# Patient Record
Sex: MALE | NOT HISPANIC OR LATINO | URBAN - METROPOLITAN AREA
[De-identification: names, ages, dates, MRNs, and addresses within clinical notes are randomized per-mention and may not be internally consistent; named-entity substitution may affect disease eponyms.]

---

## 2023-11-16 ENCOUNTER — MEDICAL CORRESPONDENCE (OUTPATIENT)
Dept: HEALTH INFORMATION MANAGEMENT | Facility: CLINIC | Age: 73
End: 2023-11-16

## 2023-11-20 ENCOUNTER — MEDICAL CORRESPONDENCE (OUTPATIENT)
Dept: HEALTH INFORMATION MANAGEMENT | Facility: CLINIC | Age: 73
End: 2023-11-20
Payer: COMMERCIAL

## 2023-11-28 ENCOUNTER — TRANSCRIBE ORDERS (OUTPATIENT)
Dept: OTHER | Age: 73
End: 2023-11-28

## 2023-11-28 DIAGNOSIS — H02.132 SENILE ECTROPION OF RIGHT LOWER EYELID: Primary | ICD-10-CM

## 2024-01-17 ENCOUNTER — OFFICE VISIT (OUTPATIENT)
Dept: OPHTHALMOLOGY | Facility: CLINIC | Age: 74
End: 2024-01-17
Attending: OPTOMETRIST
Payer: COMMERCIAL

## 2024-01-17 DIAGNOSIS — H02.834 DERMATOCHALASIS OF BOTH UPPER EYELIDS: ICD-10-CM

## 2024-01-17 DIAGNOSIS — H02.831 DERMATOCHALASIS OF BOTH UPPER EYELIDS: ICD-10-CM

## 2024-01-17 DIAGNOSIS — H02.135 SENILE ECTROPION OF BOTH LOWER EYELIDS: Primary | ICD-10-CM

## 2024-01-17 DIAGNOSIS — H02.59 FLOPPY EYELID SYNDROME OF BOTH EYES: ICD-10-CM

## 2024-01-17 DIAGNOSIS — H02.132 SENILE ECTROPION OF BOTH LOWER EYELIDS: Primary | ICD-10-CM

## 2024-01-17 PROCEDURE — 92285 EXTERNAL OCULAR PHOTOGRAPHY: CPT | Mod: GC | Performed by: OPHTHALMOLOGY

## 2024-01-17 PROCEDURE — 99204 OFFICE O/P NEW MOD 45 MIN: CPT | Mod: GC | Performed by: OPHTHALMOLOGY

## 2024-01-17 RX ORDER — METFORMIN HCL 500 MG
2000 TABLET, EXTENDED RELEASE 24 HR ORAL
COMMUNITY
Start: 2023-02-27

## 2024-01-17 RX ORDER — FUROSEMIDE 20 MG
20 TABLET ORAL
COMMUNITY
Start: 2022-04-11

## 2024-01-17 RX ORDER — LEVOTHYROXINE SODIUM 88 UG/1
0.09 TABLET ORAL
COMMUNITY
Start: 2023-02-27

## 2024-01-17 RX ORDER — TAMSULOSIN HYDROCHLORIDE 0.4 MG/1
0.8 CAPSULE ORAL
COMMUNITY
Start: 2023-09-26

## 2024-01-17 RX ORDER — METOPROLOL SUCCINATE 25 MG/1
12.5 TABLET, EXTENDED RELEASE ORAL
COMMUNITY
Start: 2023-02-27

## 2024-01-17 RX ORDER — CHOLECALCIFEROL (VITAMIN D3) 1250 MCG
50000 CAPSULE ORAL
COMMUNITY

## 2024-01-17 RX ORDER — SPIRONOLACTONE 25 MG/1
37.5 TABLET ORAL
COMMUNITY
Start: 2023-02-27

## 2024-01-17 RX ORDER — ROSUVASTATIN CALCIUM 20 MG/1
20 TABLET, COATED ORAL
COMMUNITY
Start: 2023-11-30

## 2024-01-17 RX ORDER — AMIODARONE HYDROCHLORIDE 200 MG/1
TABLET ORAL
COMMUNITY
Start: 2023-07-10

## 2024-01-17 RX ORDER — ASPIRIN 81 MG/1
81 TABLET ORAL
COMMUNITY

## 2024-01-17 ASSESSMENT — EXTERNAL EXAM - LEFT EYE: OS_EXAM: NORMAL

## 2024-01-17 ASSESSMENT — CONF VISUAL FIELD
OD_SUPERIOR_NASAL_RESTRICTION: 0
OS_SUPERIOR_NASAL_RESTRICTION: 0
OD_INFERIOR_TEMPORAL_RESTRICTION: 0
OD_SUPERIOR_TEMPORAL_RESTRICTION: 0
OS_NORMAL: 1
OS_SUPERIOR_TEMPORAL_RESTRICTION: 0
OD_NORMAL: 1
OD_INFERIOR_NASAL_RESTRICTION: 0
OS_INFERIOR_TEMPORAL_RESTRICTION: 0
OS_INFERIOR_NASAL_RESTRICTION: 0
METHOD: COUNTING FINGERS

## 2024-01-17 ASSESSMENT — EXTERNAL EXAM - RIGHT EYE: OD_EXAM: NORMAL

## 2024-01-17 ASSESSMENT — TONOMETRY
IOP_METHOD: ICARE
OD_IOP_MMHG: 14
OS_IOP_MMHG: 18

## 2024-01-17 ASSESSMENT — VISUAL ACUITY
OS_SC: 20/30
METHOD: SNELLEN - LINEAR
OD_SC: 20/25

## 2024-01-17 NOTE — NURSING NOTE
Chief Complaints and History of Present Illnesses   Patient presents with    Ectropion Evaluation       Chief Complaint(s) and History of Present Illness(es)       Ectropion Evaluation              Laterality: right lower lid              Comments    Patient referred by Dr Swenson for ectropion evaluation, RLL.   Patient states that about a year ago  his right eye started to tear up more and the lower lid started to pull away from the eye. In the past year it has continued to get worse.   Gets matter built up in the eye over night and it becomes stuck shut in the morning. No drops used.   No pain.     DM2 -   A1C was 6.0 last week according to patient                   Juancarlos Camargo, Ophthalmic Assistant

## 2024-01-17 NOTE — LETTER
2024         RE:  :  MRN: Papito White  1950  [id number]     Dear Dr. Swenson,    Thank you for asking me to see your patient, Papito White, for an oculoplastic   consultation.  My assessment and plan are below.  For further details, please see my attached clinic note.      Chief Complaint(s) and History of Present Illness(es)     Ectropion Evaluation            Laterality: right lower lid      Comments    Patient referred by Dr Swenson for ectropion evaluation, RLL.   Patient states that about a year ago  his right eye started to tear up   more and the lower lid started to pull away from the eye. In the past year   it has continued to get worse.   Gets matter built up in the eye over night and it becomes stuck shut in   the morning. No drops used.   No pain.     DM2 -   A1C was 6.0 last week according to patient    Not bothered by dermatochalasis.     Assessment & Plan     Papito White is a 74 year old male with the following diagnoses:     ICD-10-CM    1. Senile ectropion of both lower eyelids  H02.132     H02.135       2. 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 Left lower lid also has mild punctal ectropion but not symptomatic. Patient not bothered by dermatochalasis. Wears CPAP at night.  Of note, patient has history of multiple cardiac bypass procedures and has T2DM.     PLAN:    Left lower eyelid cicatricial ectropion repair with FTSG from right upper eyelid. Temporary tarsorrhaphy. See if can identify lower punctum on the left, if can identify it, can place mini Monoka stent.     - Frequent artificial tears.   - Recommend discuss cataract symptoms with home eye doctor.     Anticoagulation: Currently takes baby ASA.         Again, thank you for allowing me to participate in the care of your  patient.      Sincerely,    Michelle Johns MD  Department of Ophthalmology and Visual Neurosciences  [facility]    CC: Christel Swenson OD  [place] [id number] [facility] [address]  Via Fax: [phone]      -----------------------------------------------------------------------------------------    [facility] 2024 NEURO-PLASTICS REVIEW COURSE     Who:  All Optometrists    What: Neuro-ophthalmology and Oculoplastics Review for Optometrists    When: Saturday, February 24, 2024    COPE credits will be available for all lectures and case discussion sessions  Where: [facility], Room 3-110                  [place] [address]    Why: To improve the care of challenging patients.  To earn COPE credits.  How: Online registration can be completed at:                            [email or web address]    Cost = $100 early bird registration (before February 5, 2024)

## 2024-01-17 NOTE — PROGRESS NOTES
Chief Complaint(s) and History of Present Illness(es)     Ectropion Evaluation            Laterality: right lower lid      Comments    Patient referred by Dr Swenson for ectropion evaluation, RLL.   Patient states that about a year ago  his right eye started to tear up   more and the lower lid started to pull away from the eye. In the past year   it has continued to get worse.   Gets matter built up in the eye over night and it becomes stuck shut in   the morning. No drops used.   No pain.     DM2 -   A1C was 6.0 last week according to patient    Not bothered by dermatochalasis.     Assessment & Plan     Papito White is a 74 year old male with the following diagnoses:     ICD-10-CM    1. Senile ectropion of both lower eyelids  H02.132     H02.135       2. 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 Left lower lid also has mild punctal ectropion but not symptomatic. Patient not bothered by dermatochalasis. Wears CPAP at night.  Of note, patient has history of multiple cardiac bypass procedures and has T2DM.     PLAN:    Right lower eyelid cicatricial ectropion repair with FTSG from right upper eyelid. Temporary tarsorrhaphy. See if can identify lower punctum on the left, if can identify it, can place mini Monoka stent.     - Frequent artificial tears.   - Recommend discuss cataract symptoms with home eye doctor.     Anticoagulation: Currently takes baby ASA.       Patient disposition:   No follow-ups on file.     Vishnu Underwood MD  Ophthalmology, PGY-4         Attending Physician Attestation: Complete documentation of historical and exam elements from today's encounter can be found in the full encounter summary report (not reduplicated in this progress note). I personally obtained the chief complaint(s) and history of present illness.  I confirmed and edited as necessary the review of systems, past medical/surgical history, family history, social history, and examination findings as documented by others; and I examined the patient myself. I personally reviewed the relevant tests, images, and reports as documented above. I formulated and edited as necessary the assessment and plan and discussed the findings and management plan with the patient.  -Michelle Johns MD    Today with Papito White  and his daughter, I reviewed the indications, risks, benefits, and alternatives of the proposed surgical procedure including, but not limited to, failure obtain the desired result  and need for additional surgery, bleeding, infection, loss of vision, loss of the eye, and the remote possibility of permanent damage to any organ system or death with the use of anesthesia.  I provided multiple opportunities for the questions, answered all questions to the best of my ability, and confirmed that my answers and my discussion were understood.   Michelle Johns MD

## 2024-02-15 ENCOUNTER — ANESTHESIA EVENT (OUTPATIENT)
Dept: SURGERY | Facility: AMBULATORY SURGERY CENTER | Age: 74
End: 2024-02-15
Payer: COMMERCIAL

## 2024-02-18 NOTE — ANESTHESIA PREPROCEDURE EVALUATION
"Anesthesia Pre-Procedure Evaluation    Patient: Papito White   MRN: [id number] : 1950        Procedure : Procedure(s):  Right lower eyelid ectropion repair with full thickness skin graft and possible stent of lacrimal system.  Full thickness skin graft from right upper eyelid          No past medical history on file.   No past surgical history on file.   Allergies   Allergen Reactions    Lisinopril Anaphylaxis, Other (See Comments) and Swelling     Other Reaction(s): Lip swelling    Lip swelling   Other reaction(s): Lip swelling    No Clinical Screening - See Comments Other (See Comments)     Difficult breathing    Amlodipine Swelling     Mild ankle swelling.    Empagliflozin      Other Reaction(s): Burning sensation    Vardenafil Other (See Comments)     Watering eye.   Other reaction(s): Watery eye      Social History     Tobacco Use    Smoking status: Never    Smokeless tobacco: Never   Substance Use Topics    Alcohol use: Not on file      Wt Readings from Last 1 Encounters:   No data found for Wt           Physical Exam    Airway        Mallampati: II   TM distance: > 3 FB   Neck ROM: full   Mouth opening: > 3 cm    Respiratory Devices and Support         Dental       (+) Modest Abnormalities - crowns, retainers, 1 or 2 missing teeth and Removable bridges or other hardware      Cardiovascular   cardiovascular exam normal          Pulmonary   pulmonary exam normal                OUTSIDE LABS:  CBC: No results found for: \"WBC\", \"HGB\", \"HCT\", \"PLT\"  BMP: No results found for: \"NA\", \"POTASSIUM\", \"CHLORIDE\", \"CO2\", \"BUN\", \"CR\", \"GLC\"  COAGS: No results found for: \"PTT\", \"INR\", \"FIBR\"  POC: No results found for: \"BGM\", \"HCG\", \"HCGS\"  HEPATIC: No results found for: \"ALBUMIN\", \"PROTTOTAL\", \"ALT\", \"AST\", \"GGT\", \"ALKPHOS\", \"BILITOTAL\", \"BILIDIRECT\", \"LOW\"  OTHER: No results found for: \"PH\", \"LACT\", \"A1C\", \"CHINYERE\", \"PHOS\", \"MAG\", \"LIPASE\", \"AMYLASE\", \"TSH\", \"T4\", \"T3\", \"CRP\", \"SED\"    Anesthesia Plan    ASA " Status:  3    NPO Status:  NPO Appropriate    Anesthesia Type: MAC.     - Reason for MAC: straight local not clinically adequate   Induction: Intravenous, Propofol.   Maintenance: TIVA.        Consents    Anesthesia Plan(s) and associated risks, benefits, and realistic alternatives discussed. Questions answered and patient/representative(s) expressed understanding.     - Discussed:     - Discussed with:  Patient, Other (See Comment)      - Extended Intubation/Ventilatory Support Discussed: No.      - Patient is DNR/DNI Status: No     Use of blood products discussed: No .     Postoperative Care    Pain management: IV analgesics, Oral pain medications, Multi-modal analgesia.   PONV prophylaxis: Dexamethasone or Solumedrol, Ondansetron (or other 5HT-3), Background Propofol Infusion     Comments:               Mike Brito MD    I have reviewed the pertinent notes and labs in the chart from the past 30 days and (re)examined the patient.  Any updates or changes from those notes are reflected in this note.

## 2024-02-19 ENCOUNTER — ANESTHESIA (OUTPATIENT)
Dept: SURGERY | Facility: AMBULATORY SURGERY CENTER | Age: 74
End: 2024-02-19
Payer: COMMERCIAL

## 2024-02-19 ENCOUNTER — HOSPITAL ENCOUNTER (OUTPATIENT)
Facility: AMBULATORY SURGERY CENTER | Age: 74
Discharge: HOME | End: 2024-02-19
Attending: OPHTHALMOLOGY | Admitting: OPHTHALMOLOGY
Payer: COMMERCIAL

## 2024-02-19 VITALS
SYSTOLIC BLOOD PRESSURE: 138 MMHG | OXYGEN SATURATION: 97 % | HEART RATE: 71 BPM | DIASTOLIC BLOOD PRESSURE: 68 MMHG | RESPIRATION RATE: 16 BRPM | TEMPERATURE: 97.7 F | WEIGHT: 230 LBS

## 2024-02-19 DIAGNOSIS — H02.135 SENILE ECTROPION OF BOTH LOWER EYELIDS: Primary | ICD-10-CM

## 2024-02-19 DIAGNOSIS — H02.132 SENILE ECTROPION OF BOTH LOWER EYELIDS: Primary | ICD-10-CM

## 2024-02-19 LAB
GLUCOSE BLDC GLUCOMTR-MCNC: 137 MG/DL (ref 70–99)
GLUCOSE BLDC GLUCOMTR-MCNC: 148 MG/DL (ref 70–99)

## 2024-02-19 PROCEDURE — 68440 SNIP INC LACRIMAL PUNCTUM: CPT | Mod: RT

## 2024-02-19 PROCEDURE — 67917 REPAIR EYELID DEFECT: CPT | Performed by: NURSE ANESTHETIST, CERTIFIED REGISTERED

## 2024-02-19 PROCEDURE — G8907 PT DOC NO EVENTS ON DISCHARG: HCPCS

## 2024-02-19 PROCEDURE — 67917 REPAIR EYELID DEFECT: CPT | Mod: E4 | Performed by: OPHTHALMOLOGY

## 2024-02-19 PROCEDURE — 15260 FTH/GFT FR N/E/E/L 20 SQCM/<: CPT

## 2024-02-19 PROCEDURE — 99100 ANES PT EXTEME AGE<1 YR&>70: CPT | Performed by: STUDENT IN AN ORGANIZED HEALTH CARE EDUCATION/TRAINING PROGRAM

## 2024-02-19 PROCEDURE — 99100 ANES PT EXTEME AGE<1 YR&>70: CPT | Performed by: NURSE ANESTHETIST, CERTIFIED REGISTERED

## 2024-02-19 PROCEDURE — 68815 PROBE NASOLACRIMAL DUCT: CPT | Mod: RT

## 2024-02-19 PROCEDURE — G8916 PT W IV AB GIVEN ON TIME: HCPCS

## 2024-02-19 PROCEDURE — 67917 REPAIR EYELID DEFECT: CPT | Performed by: STUDENT IN AN ORGANIZED HEALTH CARE EDUCATION/TRAINING PROGRAM

## 2024-02-19 PROCEDURE — 68815 PROBE NASOLACRIMAL DUCT: CPT | Mod: RT | Performed by: OPHTHALMOLOGY

## 2024-02-19 PROCEDURE — 67875 CLOSURE OF EYELID BY SUTURE: CPT | Mod: RT

## 2024-02-19 PROCEDURE — 15004 WOUND PREP F/N/HF/G: CPT | Mod: GC | Performed by: OPHTHALMOLOGY

## 2024-02-19 PROCEDURE — 67961 REVISION OF EYELID: CPT | Mod: E4

## 2024-02-19 PROCEDURE — 15260 FTH/GFT FR N/E/E/L 20 SQCM/<: CPT | Mod: GC | Performed by: OPHTHALMOLOGY

## 2024-02-19 PROCEDURE — 68840 EXPLORE/IRRIGATE TEAR DUCTS: CPT | Mod: RT | Performed by: OPHTHALMOLOGY

## 2024-02-19 DEVICE — A STERILE, IMPLANTABLE, SINGLE-LUMEN TUBE INTENDED TO PROVIDE TEAR DRAINAGE FROM THE FRONT SURFACE OF THE EYE, TYPICALLY INTO THE NASAL CAVITY OR A PARANASAL SINUS, AS A DRAINAGE TREATMENT FOR LACRIMAL CANALICULAR PATHOLOGIES IN FUNCTIONAL OR OBSTRUCTIVE EPIPHORA; IT MAY ALSO BE INTENDED TO FACILITATE SALINE SOLUTION IRRIGATION TO A PARANASAL SINUS (E.G., ETHMOID SINUS) TO MANAGE CHRONIC RHINOSINUSITIS. ALSO REFERRED TO AS A LACRIMAL STENT, THE DEVICE MAY BE IMPLANTED AFTER SURGERY TO DILATE OR CREATE A SURGICAL PASSAGE [E.G., DACRYOCYSTOSTOMY/DACRYOCYSTORHINOSTOMY (DCR)], AND IS MADE OF GLASS OR SYNTHETIC POLYMER MATERIAL(S) [E.G., SILICONE].
Type: IMPLANTABLE DEVICE | Site: EYE | Status: FUNCTIONAL
Brand: LACRIMAL TUBE

## 2024-02-19 RX ORDER — SODIUM CHLORIDE, SODIUM LACTATE, POTASSIUM CHLORIDE, CALCIUM CHLORIDE 600; 310; 30; 20 MG/100ML; MG/100ML; MG/100ML; MG/100ML
INJECTION, SOLUTION INTRAVENOUS CONTINUOUS
Status: DISCONTINUED | OUTPATIENT
Start: 2024-02-19 | End: 2024-02-20 | Stop reason: HOSPADM

## 2024-02-19 RX ORDER — FENTANYL CITRATE 50 UG/ML
25 INJECTION, SOLUTION INTRAMUSCULAR; INTRAVENOUS EVERY 5 MIN PRN
Status: DISCONTINUED | OUTPATIENT
Start: 2024-02-19 | End: 2024-02-20 | Stop reason: HOSPADM

## 2024-02-19 RX ORDER — ERYTHROMYCIN 5 MG/G
OINTMENT OPHTHALMIC PRN
Status: DISCONTINUED | OUTPATIENT
Start: 2024-02-19 | End: 2024-02-19 | Stop reason: HOSPADM

## 2024-02-19 RX ORDER — OXYCODONE HYDROCHLORIDE 5 MG/1
10 TABLET ORAL
Status: DISCONTINUED | OUTPATIENT
Start: 2024-02-19 | End: 2024-02-20 | Stop reason: HOSPADM

## 2024-02-19 RX ORDER — OXYCODONE HYDROCHLORIDE 5 MG/1
5 TABLET ORAL
Status: DISCONTINUED | OUTPATIENT
Start: 2024-02-19 | End: 2024-02-20 | Stop reason: HOSPADM

## 2024-02-19 RX ORDER — ACETAMINOPHEN 325 MG/1
975 TABLET ORAL ONCE
Status: COMPLETED | OUTPATIENT
Start: 2024-02-19 | End: 2024-02-19

## 2024-02-19 RX ORDER — ONDANSETRON 2 MG/ML
4 INJECTION INTRAMUSCULAR; INTRAVENOUS EVERY 30 MIN PRN
Status: DISCONTINUED | OUTPATIENT
Start: 2024-02-19 | End: 2024-02-20 | Stop reason: HOSPADM

## 2024-02-19 RX ORDER — CEFAZOLIN SODIUM 2 G/50ML
2 SOLUTION INTRAVENOUS
Status: COMPLETED | OUTPATIENT
Start: 2024-02-19 | End: 2024-02-19

## 2024-02-19 RX ORDER — FENTANYL CITRATE 50 UG/ML
50 INJECTION, SOLUTION INTRAMUSCULAR; INTRAVENOUS EVERY 5 MIN PRN
Status: DISCONTINUED | OUTPATIENT
Start: 2024-02-19 | End: 2024-02-20 | Stop reason: HOSPADM

## 2024-02-19 RX ORDER — ONDANSETRON 4 MG/1
4 TABLET, ORALLY DISINTEGRATING ORAL EVERY 30 MIN PRN
Status: DISCONTINUED | OUTPATIENT
Start: 2024-02-19 | End: 2024-02-20 | Stop reason: HOSPADM

## 2024-02-19 RX ORDER — PROPOFOL 10 MG/ML
INJECTION, EMULSION INTRAVENOUS PRN
Status: DISCONTINUED | OUTPATIENT
Start: 2024-02-19 | End: 2024-02-19

## 2024-02-19 RX ORDER — LIDOCAINE HYDROCHLORIDE 20 MG/ML
INJECTION, SOLUTION INFILTRATION; PERINEURAL PRN
Status: DISCONTINUED | OUTPATIENT
Start: 2024-02-19 | End: 2024-02-19

## 2024-02-19 RX ORDER — LIDOCAINE 40 MG/G
CREAM TOPICAL
Status: DISCONTINUED | OUTPATIENT
Start: 2024-02-19 | End: 2024-02-20 | Stop reason: HOSPADM

## 2024-02-19 RX ORDER — FENTANYL CITRATE 50 UG/ML
INJECTION, SOLUTION INTRAMUSCULAR; INTRAVENOUS PRN
Status: DISCONTINUED | OUTPATIENT
Start: 2024-02-19 | End: 2024-02-19

## 2024-02-19 RX ORDER — CEFAZOLIN SODIUM 2 G/50ML
2 SOLUTION INTRAVENOUS SEE ADMIN INSTRUCTIONS
Status: DISCONTINUED | OUTPATIENT
Start: 2024-02-19 | End: 2024-02-20 | Stop reason: HOSPADM

## 2024-02-19 RX ORDER — ERYTHROMYCIN 5 MG/G
OINTMENT OPHTHALMIC
Qty: 3.5 G | Refills: 0 | Status: SHIPPED | OUTPATIENT
Start: 2024-02-23

## 2024-02-19 RX ORDER — TETRACAINE HYDROCHLORIDE 5 MG/ML
SOLUTION OPHTHALMIC PRN
Status: DISCONTINUED | OUTPATIENT
Start: 2024-02-19 | End: 2024-02-19 | Stop reason: HOSPADM

## 2024-02-19 RX ADMIN — PROPOFOL 130 MG: 10 INJECTION, EMULSION INTRAVENOUS at 14:07

## 2024-02-19 RX ADMIN — CEFAZOLIN SODIUM 2 G: 2 SOLUTION INTRAVENOUS at 14:00

## 2024-02-19 RX ADMIN — PROPOFOL 40 MG: 10 INJECTION, EMULSION INTRAVENOUS at 14:52

## 2024-02-19 RX ADMIN — FENTANYL CITRATE 25 MCG: 50 INJECTION, SOLUTION INTRAMUSCULAR; INTRAVENOUS at 14:05

## 2024-02-19 RX ADMIN — LIDOCAINE HYDROCHLORIDE 60 MG: 20 INJECTION, SOLUTION INFILTRATION; PERINEURAL at 14:07

## 2024-02-19 RX ADMIN — ACETAMINOPHEN 975 MG: 325 TABLET ORAL at 13:54

## 2024-02-19 RX ADMIN — SODIUM CHLORIDE, SODIUM LACTATE, POTASSIUM CHLORIDE, CALCIUM CHLORIDE: 600; 310; 30; 20 INJECTION, SOLUTION INTRAVENOUS at 13:54

## 2024-02-19 RX ADMIN — FENTANYL CITRATE 25 MCG: 50 INJECTION, SOLUTION INTRAMUSCULAR; INTRAVENOUS at 14:19

## 2024-02-19 RX ADMIN — PROPOFOL 30 MG: 10 INJECTION, EMULSION INTRAVENOUS at 14:19

## 2024-02-19 NOTE — ANESTHESIA CARE TRANSFER NOTE
Patient: Papito White    Procedure: Procedure(s):  Right lower eyelid ectropion repair with full thickness skin graft and stent of lacrimal system.  Full thickness skin graft from right upper eyelid       Diagnosis: Senile ectropion of both lower eyelids [H02.132, H02.135]  Diagnosis Additional Information: No value filed.    Anesthesia Type:   MAC     Note:    Oropharynx: oropharynx clear of all foreign objects    Oxygen Supplementation: room air    Independent Airway: airway patency satisfactory and stable  Dentition: dentition unchanged  Vital Signs Stable: post-procedure vital signs reviewed and stable  Report to RN Given: handoff report given  Patient transferred to: Phase II  Comments: To Phase II. Report to RN.  VSS Resp status stable.  Handoff Report: Identifed the Patient, Identified the Reponsible Provider, Reviewed the pertinent medical history, Discussed the surgical course, Reviewed Intra-OP anesthesia mangement and issues during anesthesia, Set expectations for post-procedure period and Allowed opportunity for questions and acknowledgement of understanding  Vitals:  Vitals Value Taken Time   BP     Temp     Pulse     Resp     SpO2         Electronically Signed By: CHASE Tucker CRNA  February 19, 2024  3:05 PM

## 2024-02-19 NOTE — ANESTHESIA POSTPROCEDURE EVALUATION
Patient: Papito White    Procedure: Procedure(s):  Right lower eyelid ectropion repair with full thickness skin graft and stent of lacrimal system.  Full thickness skin graft from right upper eyelid       Anesthesia Type:  MAC    Note:  Disposition: Outpatient   Postop Pain Control: Uneventful            Sign Out: Well controlled pain   PONV: No   Neuro/Psych: Uneventful            Sign Out: Acceptable/Baseline neuro status   Airway/Respiratory: Uneventful            Sign Out: Acceptable/Baseline resp. status   CV/Hemodynamics: Uneventful            Sign Out: Acceptable CV status; No obvious hypovolemia; No obvious fluid overload   Other NRE:    DID A NON-ROUTINE EVENT OCCUR?            Last vitals:  Vitals Value Taken Time   /70 02/19/24 1501   Temp 97.7  F (36.5  C) 02/19/24 1501   Pulse     Resp 16 02/19/24 1501   SpO2 96 % 02/19/24 1501       Electronically Signed By: Mike Brito MD  February 19, 2024  3:25 PM

## 2024-02-19 NOTE — DISCHARGE INSTRUCTIONS
Post-operative Instructions  Ophthalmic Plastic and Reconstructive Surgery    Michelle Johns M.D.     All instructions apply to the operated eye(s) or eyelid(s).    Wound care and personal care  ? If a patch or bandage has been placed, please leave this in place until seen by your physician. Ensure that the bandage does not get wet when you take a shower.   ? Do not apply ice.  ? You may shower or wash your hair the day after surgery. Do not go swimming for at least 2 weeks to prevent contamination of your wounds.  ? You may go for walks and light activity is ok, but no heavy (over 15 pounds) lifting, bending or excessive straining for one week.   ? Do not apply make-up to the eyes or eyelids for 2 weeks after surgery.  ? Expect some swelling, bruising, black eye (even into the lower eyelids and cheeks). Also expect serum caking, crusting and discharge from the eye and/or incisions. A small amount of surface bleeding, and depending on the type of surgery, bleeding from the inside of the eyelid, is normal for the first 48 hours.  ? Avoid straining, bending at the waist, or lifting more than 15 pounds for 1 week. Sleeping with your head elevated, such as in a recliner, for the first several days can help swelling resolve more quickly.   ? Do continue to ambulate (walk) as you normally would - being sedentary after surgery can cause blood clots.   ? Your eye(s) and eyelid(s) may be painful and tender. This is normal after surgery.      Contact information and follow-up  ? Return to the Eye Clinic for a follow-up appointment with your physician as scheduled. If no appointment has been scheduled:   - [phone] for an appointment with Dr. Johns within 1 week from your date of surgery.     ? For severe pain, bleeding, or loss of vision, call the [facility] at [phone].    After hours or on weekends and holidays, call [phone] and ask to speak with the ophthalmologist on  call.    An on call person can be reached after hours for concerns. The on call doctor should not call in medication refill requests after hours or on weekends, so please plan accordingly. An effort has been made to provide adequate pain medications following every surgery, and refills will not be provided in most instances.     Medications  ? Restart all regular home medications and eye drops. If you take Plavix or Aspirin on a regular basis, wait for 72 hours after your surgery before restarting these in order to decrease the risk of bleeding complications.  ? Avoid aspirin and aspirin-like medications (Motrin, Aleve, Ibuprofen, Katerin-Dieterich etc) for 72 hours to reduce the risk of bleeding. You may take Tylenol (acetaminophen) for pain.  ? In addition to your home medications, take the following post-operative medications as prescribed by your physician.    ? Apply antibiotic ointment to all sutures three times a day, and into the operated eye(s) at night.   Once you run out, you can apply Vaseline or Aquaphor (over the counter) to the incisions. Don't put the Vaseline or Aquaphor into your eyes.   ? If you have ocular irritation, you can use over the counter artificial tears such as Refresh, Systane, or Blink. Do not use Visine, Clear Eyes, or any other drop that gets the red out.

## 2024-02-19 NOTE — OP NOTE
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   PROCEDURE: Right  lower eyelid ectropion repair with full-thickness skin graft (right upper eyelid area donor site) and temporary tarsorrhaphy. Exploration of the right lower punctum, punctoplasty and placement of Mini Monoka Stent.   SURGEON: Michelle Johns MD   ASSISTANT: Jeff Dennis MD  ANESTHESIA: Monitored with local infiltration, 50/50 mixture of 2% lidocaine with epinephrine and 0.5% Marcaine.   COMPLICATIONS: None.   ESTIMATED BLOOD LOSS: Less than 5 mL.   HISTORY: Papito White  presented with a cicatricial ectropion of the right  lower lid due to cicatricial as well as involutional changes. Additionally he had significant punctal and stenosis causing epiphora. After the risks, benefits and alternatives to the proposed procedure were explained, informed consent was obtained.   DESCRIPTION OF PROCEDURE: Papito White  was brought to the operating room and placed supine on the operating table. IV sedation was given. The right  lower lid and lateral canthal area were infiltrated with local anesthetic. The right upper lid was also infiltrated. The area was prepped and draped in the typical sterile fashion for oculoplastic surgery. Attention was directed to the right  side. First there was no lower punctum visible but with direct pressure over the eyelid I was able to express a bit of purulent tears. A stretch punctoplasty was performed with a punctal dilator. A 0 Dos Santos probe was passed and there was felt to be quite a bit of canalicular stenosis as well. The upper punctum was not visible as well. The area was irrigated with saline through a lacrimal cannula. A mini Monoka stent was threaded through and the foot plate seated into the lower punctum.   A 4-0 silk suture was  passed through the lower eyelid. A subciliary incision was made with a 15 blade and cicatricial forces released with the David scissors. Hemostasis was obtained. A lateral tarsal strip was fashioned. Adequate cicatrix was released until the lower eyelid could sit in a natural position without tension. The lateral tarsus was secured to the lateral orbital rim periosteum with a 5-0 Vicryl suture. The skin deficit was marked on a template. This was placed in the upper eyelid space where an ellipse was drawn around it. The upper eyelid skin was incised with a #15 blade. The graft was excised with a David scissors. Hemostasis was obtained. The donor site was closed with interrupted 6-0 plain gut sutures. The graft was trimmed to fit in the lower lid defect. It was secured with interrupted 6-0 chromic sutures in the cardinal positions and running 6-0 plain gut suture superiorly and inferiorly. A 4-0 Prolene suture was placed to the lid margin through a Telfa bolster and lower eyelid. The double-armed suture was then brought through the upper eyelid margin and through the brow as a temporary tarsorrhaphy. Erythromycin ointment was applied. A pressure patch dressing of Telfa and cotton was then placed over the graft. Papito White  tolerated the procedure well. Papito White  left the operating room in stable condition.    Michelle Johns MD

## 2024-02-22 ENCOUNTER — ALLIED HEALTH/NURSE VISIT (OUTPATIENT)
Dept: NURSING | Facility: CLINIC | Age: 74
End: 2024-02-22
Payer: COMMERCIAL

## 2024-02-22 DIAGNOSIS — H02.132 SENILE ECTROPION OF BOTH LOWER EYELIDS: Primary | ICD-10-CM

## 2024-02-22 DIAGNOSIS — H02.135 SENILE ECTROPION OF BOTH LOWER EYELIDS: Primary | ICD-10-CM

## 2024-02-22 PROCEDURE — 99207 PR NO CHARGE NURSE ONLY: CPT

## 2024-02-22 NOTE — PROGRESS NOTES
Pt here for patch and frost suture removal from right eye.  Patch securely in place, removed without difficulty.  Mcqueen suture removed without complication.  Lower eyelid graft securely seated in place, moderate bruising noted, no bubbling of graft.  Reviewed erythromycin ointment use to suture lines and graft.  Discussed moderate activity for a week, no heavy lifting or bending.  6 week follow up appointment with  Dr Johns scheduled.  Pt verbalized understanding of plan.  Shilpi Dean RN

## 2024-04-17 ENCOUNTER — OFFICE VISIT (OUTPATIENT)
Dept: OPHTHALMOLOGY | Facility: CLINIC | Age: 74
End: 2024-04-17
Payer: COMMERCIAL

## 2024-04-17 DIAGNOSIS — H02.135 SENILE ECTROPION OF BOTH LOWER EYELIDS: Primary | ICD-10-CM

## 2024-04-17 DIAGNOSIS — H02.132 SENILE ECTROPION OF BOTH LOWER EYELIDS: Primary | ICD-10-CM

## 2024-04-17 PROCEDURE — 99024 POSTOP FOLLOW-UP VISIT: CPT | Performed by: OPHTHALMOLOGY

## 2024-04-17 ASSESSMENT — VISUAL ACUITY
OS_SC: 20/25
METHOD: SNELLEN - LINEAR
OD_SC: 20/25
OS_SC+: -1

## 2024-04-17 ASSESSMENT — TONOMETRY
OD_IOP_MMHG: 17
IOP_METHOD: ICARE
OS_IOP_MMHG: 21

## 2024-04-17 NOTE — NURSING NOTE
Chief Complaints and History of Present Illnesses   Patient presents with    Post Op (Ophthalmology) Right Eye     Chief Complaint(s) and History of Present Illness(es)       Post Op (Ophthalmology) Right Eye              Laterality: right eye              Comments    Pt returns for 1 month post-op following right lower eyelid ectropion repair with full thickness skin graft and stent of lacrimal system. Pt is very happy with the surgical outcome, and he states that the tearing has resolved.

## 2024-04-17 NOTE — PROGRESS NOTES
Chief Complaint(s) and History of Present Illness(es)     Post Op (Ophthalmology) Right Eye            Laterality: right eye          Comments    Pt returns for 1 month post-op following right lower eyelid ectropion   repair with full thickness skin graft and stent of lacrimal system. Pt is   very happy with the surgical outcome, and he states that the tearing has   resolved.         Excellent right lower eyelid position and complete take of the skin graft. His mini Monoka stent was removed from the right lower eyelid.      He will follow up on an as needed basis.       Attending Physician Attestation: Complete documentation of historical and exam elements from today's encounter can be found in the full encounter summary report (not reduplicated in this progress note). I personally obtained the chief complaint(s) and history of present illness. I confirmed and edited as necessary the review of systems, past medical/surgical history, family history, social history, and examination findings as documented by others; and I examined the patient myself. I personally reviewed the relevant tests, images, and reports as documented above. I formulated and edited as necessary the assessment and plan and discussed the findings and management plan with the patient and family. I personally reviewed the ophthalmic test(s) associated with this encounter, agree with the interpretation(s) as documented by the resident/fellow, and have edited the corresponding report(s) as necessary. Michelle Johns MD

## 2024-04-17 NOTE — LETTER
2024         RE:  :  MRN: Papito White  1950  [id number]     Dear Dr. Swenson,    Your patient, Papito White, returned for oculoplastic follow up. My assessment and plan are below.  For further details, please see my attached clinic note.      Chief Complaint(s) and History of Present Illness(es)     Post Op (Ophthalmology) Right Eye            Laterality: right eye          Comments    Pt returns for 1 month post-op following right lower eyelid ectropion   repair with full thickness skin graft and stent of lacrimal system. Pt is   very happy with the surgical outcome, and he states that the tearing has   resolved.          Excellent right lower eyelid position and complete take of the skin graft. His mini Monoka stent was removed from the right lower eyelid.      He will follow up on an as needed basis.         Attending Physician Attestation: Complete documentation of historical and exam elements from today's encounter can be found in the full encounter summary report (not reduplicated in this progress note). I personally obtained the chief complaint(s) and history of present illness. I confirmed and edited as necessary the review of systems, past medical/surgical history, family history, social history, and examination findings as documented by others; and I examined the patient myself. I personally reviewed the relevant tests, images, and reports as documented above. I formulated and edited as necessary the assessment and plan and discussed the findings and management plan with the patient and family. I personally reviewed the ophthalmic test(s) associated with this encounter, agree with the interpretation(s) as documented by the resident/fellow, and have edited the corresponding report(s) as necessary. Michelle Johns MD             Again, thank you for allowing me to participate in the care of your patient.      Sincerely,    Michelle Johns MD  Department of Ophthalmology  and Visual Neurosciences  [facility]      CC: Christel Swenson OD  [facility]  [address] [facility] [address]  Via Fax: [phone]

## (undated) DEVICE — PACK MINOR EYE

## (undated) DEVICE — SOL WATER IRRIG 1000ML BOTTLE 07139-09

## (undated) DEVICE — NDL 30GA 1" 305128

## (undated) DEVICE — SU VICRYL 5-0 S-14 12" J553G

## (undated) DEVICE — ESU ELEC NDL 1" COATED/INSULATED E1465

## (undated) DEVICE — NDL 30GA 0.5" 305106

## (undated) DEVICE — SU SILK 4-0 P-3 8" 641G

## (undated) DEVICE — SYR 03ML LL W/O NDL

## (undated) DEVICE — SU PROLENE 4-0 RB-1DA 36" 8557H

## (undated) DEVICE — PREP CHLORAPREP 26ML TINTED ORANGE  260815

## (undated) DEVICE — GLOVE BIOGEL PI MICRO SZ 7.5 48575

## (undated) RX ORDER — FENTANYL CITRATE 50 UG/ML
INJECTION, SOLUTION INTRAMUSCULAR; INTRAVENOUS
Status: DISPENSED
Start: 2024-02-19

## (undated) RX ORDER — CEFAZOLIN SODIUM 2 G/50ML
SOLUTION INTRAVENOUS
Status: DISPENSED
Start: 2024-02-19

## (undated) RX ORDER — ACETAMINOPHEN 325 MG/1
TABLET ORAL
Status: DISPENSED
Start: 2024-02-19